# Patient Record
Sex: MALE | Race: WHITE
[De-identification: names, ages, dates, MRNs, and addresses within clinical notes are randomized per-mention and may not be internally consistent; named-entity substitution may affect disease eponyms.]

---

## 2019-01-06 ENCOUNTER — HOSPITAL ENCOUNTER (EMERGENCY)
Dept: HOSPITAL 10 - FTE | Age: 34
Discharge: HOME | End: 2019-01-06
Payer: COMMERCIAL

## 2019-01-06 ENCOUNTER — HOSPITAL ENCOUNTER (EMERGENCY)
Dept: HOSPITAL 91 - FTE | Age: 34
Discharge: HOME | End: 2019-01-06
Payer: COMMERCIAL

## 2019-01-06 VITALS — SYSTOLIC BLOOD PRESSURE: 131 MMHG | DIASTOLIC BLOOD PRESSURE: 79 MMHG | HEART RATE: 77 BPM

## 2019-01-06 DIAGNOSIS — S61.012A: Primary | ICD-10-CM
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
## 2019-01-06 DIAGNOSIS — Z23: ICD-10-CM
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
## 2019-01-06 PROCEDURE — 90715 TDAP VACCINE 7 YRS/> IM: CPT

## 2019-01-06 PROCEDURE — 90471 IMMUNIZATION ADMIN: CPT

## 2019-01-06 PROCEDURE — 12002 RPR S/N/AX/GEN/TRNK2.6-7.5CM: CPT

## 2019-01-06 PROCEDURE — 99283 EMERGENCY DEPT VISIT LOW MDM: CPT

## 2019-01-06 RX ADMIN — BUPIVACAINE HYDROCHLORIDE 1 ML: 2.5 INJECTION, SOLUTION EPIDURAL; INFILTRATION; INTRACAUDAL; PERINEURAL at 19:00

## 2019-01-06 RX ADMIN — CLOSTRIDIUM TETANI TOXOID ANTIGEN (FORMALDEHYDE INACTIVATED), CORYNEBACTERIUM DIPHTHERIAE TOXOID ANTIGEN (FORMALDEHYDE INACTIVATED), BORDETELLA PERTUSSIS TOXOID ANTIGEN (GLUTARALDEHYDE INACTIVATED), BORDETELLA PERTUSSIS FILAMENTOUS HEMAGGLUTININ ANTIGEN (FORMALDEHYDE INACTIVATED), BORDETELLA PERTUSSIS PERTACTIN ANTIGEN, AND BORDETELLA PERTUSSIS FIMBRIAE 2/3 ANTIGEN 1 ML: 5; 2; 2.5; 5; 3; 5 INJECTION, SUSPENSION INTRAMUSCULAR at 20:00

## 2019-01-06 RX ADMIN — IBUPROFEN 1 MG: 600 TABLET ORAL at 19:59

## 2019-01-06 NOTE — ERD
ER Documentation


Chief Complaint


Chief Complaint








HPI


This is a 33-year-old male who presents for evaluation of a laceration over his 


left thumb.  Patient is right-hand dominant, he works in film production, he was


cutting an avocado when this happened, he denies decreased range of motion, does


endorse some pain to his finger.  No numbness or tingling.





ROS


All systems reviewed and are negative except as per history of present illness.





Allergies


Allergies:  


Coded Allergies:  


     Unknown: Unable to obtain (Unverified , 1/6/19)





Physical Exam


Physical Exam


Const:   Well-appearing, nontoxic


Head:   Atraumatic 


Eyes:    Normal Conjunctiva


ENT:    Normal External Ears, Nose and Mouth.


Neck:               Full range of motion. No meningismus.


Resp:   Normal effort


Cardio:   Regular rate and rhythm


Skin:   No petechiae or rashes


Ext:    L thumb on the palmar aspect there is a 3 cm laceration, there is 


bleeding, no foreign body.  Full range of motion of the PIP and DIP joint, 


sensation intact light touch.


Neur:   Awake and alert


Psych:    Normal Mood and Affect


Results 24 hrs





Current Medications


 Medications
   Dose
          Sig/Arie
       Start Time
   Status  Last


 (Trade)       Ordered        Route
 PRN     Stop Time              Admin
Dose


                              Reason                                Admin


 Bupivacaine    10 ml          ONCE  ONCE
    1/6/19        Cancel   



HCl
                          INJ
           18:30
 1/6/19


(Marcaine                                    18:31


0.25%
  (Mpf)


10 ml)


 Ibuprofen
     600 mg         ONCE  ONCE
    1/6/19        DC       



(Motrin)                      PO
            18:30
 1/6/19


                                             18:31


 Diphtheria/
   0.5 ml         ONCE ONCE
     1/6/19        DC       



Tetanus/Acell                 IM*
           19:00
 1/6/19



 Pertussis
                                 19:01


(Adacel)


 Bupivacaine    1 ml           ONCE
 INJ
     1/6/19        DC       



HCl
                                         19:00
 1/6/19


(Marcaine                                    19:01


0.25%
  (Mpf)


30 ml)








Procedures/MDM


33-year-old male presents for evaluation of uncomplicated laceration of his left


hand, no neurovascular deficits, no evidence of tendon injury, no evidence of 


fracture.  Laceration was repaired by Dr. Hanks, patient advised to return for 


follow-up for suture removal, tetanus was updated, at discharge patient was in 


no acute distress.





Departure


Diagnosis:  


   Primary Impression:  


   Laceration


Condition:  Stable











AG COREY MD                Jan 6, 2019 18:03

## 2019-01-09 ENCOUNTER — HOSPITAL ENCOUNTER (EMERGENCY)
Dept: HOSPITAL 10 - FTE | Age: 34
Discharge: HOME | End: 2019-01-09
Payer: COMMERCIAL

## 2019-01-09 ENCOUNTER — HOSPITAL ENCOUNTER (EMERGENCY)
Dept: HOSPITAL 91 - FTE | Age: 34
Discharge: HOME | End: 2019-01-09
Payer: COMMERCIAL

## 2019-01-09 VITALS — HEART RATE: 92 BPM | RESPIRATION RATE: 18 BRPM | DIASTOLIC BLOOD PRESSURE: 70 MMHG | SYSTOLIC BLOOD PRESSURE: 141 MMHG

## 2019-01-09 VITALS — BODY MASS INDEX: 33.33 KG/M2 | WEIGHT: 169.76 LBS | HEIGHT: 60 IN

## 2019-01-09 DIAGNOSIS — J45.909: ICD-10-CM

## 2019-01-09 DIAGNOSIS — Z48.01: Primary | ICD-10-CM

## 2019-01-09 PROCEDURE — 99281 EMR DPT VST MAYX REQ PHY/QHP: CPT

## 2019-01-09 NOTE — ERD
ER Documentation


Chief Complaint


Chief Complaint





left thumb lac wound check





HPI


33-year-old male, previously healthy, presents to the emergency department, for 


wound check after a left thumb laceration repaired performed 2 days ago.  The 


patient refers feeling better, pain under control, denies distal weakness, numb


ness or tingling.  No fever or chills.  Good compliance with antibiotics





ROS


All systems reviewed and are negative except as per history of present illness.





Medications


Home Meds


Active Scripts


Hydrocodone/Acetaminophen (Norco 5-325 Tablet) 1 Each Tablet, 1 TAB PO Q6H PRN 


for PAIN, #7 TAB


   Prov:AG COREY MD         1/6/19


Cephalexin* (Keflex*) 500 Mg Capsule, 500 MG PO QID for 7 Days, CAP


   Prov:AG COREY MD         1/6/19





Allergies


Allergies:  


Coded Allergies:  


     Unknown: Unable to obtain (Unverified , 1/6/19)





PMhx/Soc


History of Surgery:  Yes (Sleeve gastric)


Anesthesia Reaction:  No


Hx Neurological Disorder:  No


Hx Respiratory Disorders:  Yes (childhood asthma)


Hx Cardiac Disorders:  No


Hx Psychiatric Problems:  No


Hx Miscellaneous Medical Probl:  No


Hx Alcohol Use:  Yes (Every now and then)


Hx Substance Use:  Yes (Marijuana)


Hx Tobacco Use:  No


Smoking Status:  Never smoker





FmHx


Family History:  No diabetes





Physical Exam


Vitals





Vital Signs


  Date      Temp  Pulse  Resp  B/P (MAP)   Pulse Ox  O2          O2 Flow    FiO2


Time                                                 Delivery    Rate


    1/9/19  98.1     92    18      141/70        99


     09:51                           (93)





Physical Exam


Const:   No acute distress


Head:   Atraumatic 


Eyes:    Normal Conjunctiva


ENT:    Normal External Ears, Nose and Mouth.


Neck:               Full range of motion. No meningismus.


Resp:   Clear to auscultation bilaterally


Cardio:   Regular rate and rhythm, no murmurs


Abd:    Soft, non tender, non distended. Normal bowel sounds


Skin:   No petechiae or rashes.  Left thumb with 8 cm V shape laceration on the 


anterior aspect, with stitches in place, clean, dry and intact.  Distal 


neurovascular exam intact


Back:   No midline or flank tenderness


Ext:    No cyanosis, or edema


Neur:   Awake and alert


Psych:    Normal Mood and Affect





Procedures/MDM


Status post laceration repair 2 days ago.  Adequate pain control, no fever, no 


chills, good compliance with medications no side effects.


The patient was evaluated for infection and neurovascular compromise.


The wound was clean and irrigated with normal saline, splint placed.





Patient is stable, with adequate healing process, okay to discharge home, 


medication adherence reinforced.  some side effects of prescribed medications 


(headache, rash, nausea, vomiting, diarrhea, drowsiness, habituation, bleeding, 


hypertension, interactions with other medications) were reviewed.





The patient was instructed to follow up with the primary care provider in the 


next 48h.  If symptoms persist, worsen or new symptoms develop, then patient 


should return to the ED immediately.





Instructions explained and given directly by me to the patient with 


acknowledgment and demonstrated understanding.





Disclaimer: Inadvertent spelling and grammatical errors are likely due to 


EHR/dictation software use and do not reflect on the overall quality of patient 


care. Also, please note that the electronic time recorded on this note does not 


necessarily reflect the actual time of the patient encounter.





Departure


Diagnosis:  


   Primary Impression:  


   Encounter for wound re-check


Condition:  Stable


Patient Instructions:  Wound Check, Lac F/U (No Infection)





Additional Instructions:  


Thank you very much for allowing us to participate in your care. 


Your health and safety is our top priority at [facility].





Call your primary care doctor TOMORROW for an appointment during the next 2-4 


days and bring all the information and medications prescribed. 





Have prescriptions filled and follow precisely the directions on the label. 





If the symptoms get worse and your provider is unavailable, return to the 


Emergency Department immediately.











JULITA SAM MD       Jan 9, 2019 10:38